# Patient Record
(demographics unavailable — no encounter records)

---

## 2025-06-27 NOTE — DISCUSSION/SUMMARY
[] : The components of the vaccine(s) to be administered today are listed in the plan of care. The disease(s) for which the vaccine(s) are intended to prevent and the risks have been discussed with the caretaker.  The risks are also included in the appropriate vaccination information statements which have been provided to the patient's caregiver.  The caregiver has given consent to vaccinate. [FreeTextEntry1] : WELL CARE NO COMPLAINTS The components of the vaccine(s) to be administered today are listed in the plan of care. The disease(s) for which the vaccine(s) are intended to prevent and the risks have been discussed with the caretaker. The risks are also included in the appropriate vaccination information statements which have been provided to the patient's caregiver. The caregiver has given consent to vaccinate.